# Patient Record
Sex: MALE | Race: WHITE
[De-identification: names, ages, dates, MRNs, and addresses within clinical notes are randomized per-mention and may not be internally consistent; named-entity substitution may affect disease eponyms.]

---

## 2019-08-28 ENCOUNTER — HOSPITAL ENCOUNTER (EMERGENCY)
Dept: HOSPITAL 33 - ED | Age: 74
Discharge: HOME | End: 2019-08-28
Payer: MEDICARE

## 2019-08-28 VITALS — OXYGEN SATURATION: 100 % | DIASTOLIC BLOOD PRESSURE: 52 MMHG | SYSTOLIC BLOOD PRESSURE: 138 MMHG | HEART RATE: 52 BPM

## 2019-08-28 DIAGNOSIS — S50.812A: ICD-10-CM

## 2019-08-28 DIAGNOSIS — S80.212A: ICD-10-CM

## 2019-08-28 DIAGNOSIS — S50.02XA: Primary | ICD-10-CM

## 2019-08-28 PROCEDURE — 90471 IMMUNIZATION ADMIN: CPT

## 2019-08-28 PROCEDURE — 99283 EMERGENCY DEPT VISIT LOW MDM: CPT

## 2019-08-28 PROCEDURE — 90715 TDAP VACCINE 7 YRS/> IM: CPT

## 2019-08-28 PROCEDURE — 73090 X-RAY EXAM OF FOREARM: CPT

## 2019-08-28 NOTE — XRAY
Indication: Pain.



Comparison: None



2 portable views of the left forearm demonstrates mild wrist degenerative

changes and tiny anterior elbow soft tissue calcified granuloma.  No other

bony, articular, or soft tissue abnormalities.

## 2019-08-28 NOTE — ERPHSYRPT
- History of Present Illness


Time Seen by Provider: 08/28/19 15:44


Source: patient


Exam Limitations: no limitations


Physician History: 





Pt states, he lost his balance and fell [place] a [place], injured his left elbow 

and knee, denies head or neck, other injury or LOC, no other complaints. He is 

not sure about his tetanus status.


Occurred: this morning


Reason for Fall: lost balance


Injuries/Pain Location: upper extremity, lower extremity


Loss of Consciousness: no loss of consciousness


Quality: aching


Severity of Pain-Max: mild


Severity of Pain-Current: mild


Associated Symptoms (Fall): denies symptoms


Allergies/Adverse Reactions: 








Penicillins Allergy (Verified 08/28/19 15:45)


 





Home Medications: 








Atenolol [Tenormin] 50 mg PO DAILY 08/28/19 [History]


Atorvastatin Calcium [Lipitor] 40 mg PO DAILY 08/28/19 [History]


Benazepril HCl 10 mg*** [Lotensin 10 MG***] 10 mg PO DAILY 08/28/19 [History]


Levetiracetam*** [Keppra 500 mg ***] 500 mg PO BID 08/28/19 [History]


Nifedipine [Nifedipine ER] 60 mg PO DAILY 08/28/19 [History]


Prednisone 10 mg*** [Deltasone 10 mg***] 10 mg PO DAILY 08/28/19 [History]


Tamsulosin HCl 0.4 mg PO DAILY 08/28/19 [History]








- Review of Systems


Constitutional: No Symptoms


Eyes: No Symptoms


Ears, Nose, & Throat: No Symptoms


Respiratory: No Symptoms


Cardiac: No Symptoms


Abdominal/Gastrointestinal: No Symptoms


Genitourinary Symptoms: No Symptoms


Musculoskeletal: Other (abrasions, skin tears to left elbow, forearm and knee.)


Skin: No Symptoms


Neurological: No Symptoms


All Other Systems: Reviewed and Negative





- Nursing Vital Signs


Nursing Vital Signs: 


 Initial Vital Signs











Temperature  98.3 F   08/28/19 15:32


 


Pulse Rate  74   08/28/19 15:32


 


Respiratory Rate  16   08/28/19 15:32


 


Blood Pressure  150/74   08/28/19 15:32


 


O2 Sat by Pulse Oximetry  96   08/28/19 15:32








 Pain Scale











Pain Intensity                 6

















- [place] Coma Score


Best Eye Response ([place]): (4) open spontaneously


Best Verbal Response ([place]): (5) oriented


Best Motor Response ([place]andra): (6) obeys commands


[place] Total: 15





- Physical Exam


General Appearance: no apparent distress


Head Injury: no evidence of injury


Eye Exam: PERRL/EOMI, eyes nml inspection


ENT Exam: airway nml, No evidence of ENT injury


Neck Exam: supple, trachea midline, full range of motion, normal inspection, No 

muscle spasm, No pain on movement of neck


Respiratory/Chest Exam: normal breath sounds, No chest tenderness, No ecchymosis

, No crepitus


Cardiovascular Exam: normal heart sounds, regular rate/rhythm, normal 

peripheral pulses, No murmur, No JVD


Gastrointestinal Exam: soft, normal bowel sounds, No tenderness, No distention, 

No mass, No guarding, No ecchymosis, No rebound


Back Exam: normal inspection, No CVA tenderness, No vertebral tenderness


Extremity Exam: other (left olecranon and proximal, dorsal forearm: superficial 

skin tears, slight bleeding at the elbow, no large hematoma, deformity, good ROM

, normal distal pulses and sensation. Left knee: small, superficial prepatellar 

abrasions, no swelling, effusion, pain free motions of the joint, good distal 

pulses and sensation.)


Peripheral Pulses: dorsalis-pedis (R): 2+, dorsalis-pedis (L): 2+


Neurologic Exam: alert, oriented x 3, cooperative, normal mood/affect, 

sensation nml, No motor deficits


Skin Exam: normal color, warm, dry, No petechiae, No diaphoresis


**SpO2 Interpretation**: normal


O2 Delivery: Room Air





- Course


Nursing assessment & vital signs reviewed: Yes





- Radiology Exams


  ** Left Forearm


X-ray Interpretation: Interpreted by me, Negative


Ordered Tests: 


 Active Orders 24 hr











 Category Date Time Status


 


 Wound Care STAT Care  08/28/19 15:43 Active


 


 FOREARM Stat Exams  08/28/19 15:43 Taken








Medication Summary














Discontinued Medications














Generic Name Dose Route Start Last Admin





  Trade Name Freq  PRN Reason Stop Dose Admin


 


Bacitracin Zinc  Confirm  08/28/19 15:44  





  Baciguent Packet***  Administered  08/28/19 15:45  





  Dose   





  4 gm   





  .ROUTE   





  .STK-MED ONE   





     





     





     





     


 


Diphtheria/Tetanus/Acell Pertussis  0.5 ml  08/28/19 15:43  





  Adacel Vial***  IM  08/28/19 15:44  





  .ONCE ONE   





     





     





     





     














- Progress


Progress: unchanged


Progress Note: 





08/28/19 16:22


Pt has been stable, denies severe pain, reviewed his X ray,he is getting 

discharged after his wounds cleaned, to rest x 2-3 days and follow up with his 

physician in 2-3 days.


Counseled pt/family regarding: diagnosis, need for follow-up, rad results





- Departure


Departure Disposition: Home


Clinical Impression: 


Elbow contusion


Qualifiers:


 Encounter type: initial encounter Laterality: left Qualified Code(s): S50.02XA 

- Contusion of left elbow, initial encounter





Abrasion forearm


Qualifiers:


 Encounter type: initial encounter Laterality: left Qualified Code(s): S50.812A 

- Abrasion of left forearm, initial encounter





Abrasion of knee, left


Qualifiers:


 Encounter type: initial encounter Qualified Code(s): S80.212A - Abrasion, left 

knee, initial encounter





Condition: Stable


Critical Care Time: No


Referrals: 


JOSEPH MERAZ MD [Primary Care Provider] - 


Instructions:  Wound Care (DC), Skin Abrasions


Additional Instructions: 


Rest with elevated arm x 2-3 days, apply ice or cold compresses to swelling and 

follow up with your physician in 2-3 days, return if severe pain, swelling or 

sudden discoloration, coldness,numbness of the fingers!